# Patient Record
Sex: MALE | Race: WHITE | NOT HISPANIC OR LATINO | URBAN - METROPOLITAN AREA
[De-identification: names, ages, dates, MRNs, and addresses within clinical notes are randomized per-mention and may not be internally consistent; named-entity substitution may affect disease eponyms.]

---

## 2017-05-30 ENCOUNTER — ALLSCRIPTS OFFICE VISIT (OUTPATIENT)
Dept: OTHER | Facility: OTHER | Age: 77
End: 2017-05-30

## 2017-10-03 ENCOUNTER — ALLSCRIPTS OFFICE VISIT (OUTPATIENT)
Dept: OTHER | Facility: OTHER | Age: 77
End: 2017-10-03

## 2017-10-27 NOTE — PROGRESS NOTES
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)    Plan  Hypopituitarism    · Continue with our present treatment plan ; Status:Complete;   Done: [id number]   Ordered;For:Hypopituitarism; Ordered By:Keron Jiang;   · Follow-up visit in 6 months Evaluation and Treatment  Follow-up  Status: Complete   Done: [id number]   Ordered; For: Hypopituitarism; Ordered By: Jayjay Martini Performed:  Due: 65HDX1549; Last Updated By: Mónica Duran; 10/3/2017 1:13:13 PM  Mild cognitive impairment    · Memantine HCl - 10 MG Oral Tablet; take 1 tablet by mouth twice a day   Rx By: Jayjay Martini; Dispense: 30 Days ; #:60 Tablet; Refill: 6;For: Mild cognitive impairment; DENISE = N; Verified Transmission to [id number] Last Updated By: System, [employer]; 10/3/2017 1:11:40 PM    Discussion/Summary  Discussion Summary:   Patient with a history of mild cognitive impairment has been relatively stable on his current medications  He is advised to continue Aricept and Namenda continue cognitive therapy and return back to see me in 6 months  He denies any side effects from the medications  Chief Complaint  Chief Complaint Free Text Note Form: Patient present for a follow up with a history of mild cognitive impairment and hypopituitarism  History of Present Illness  HPI: Patient is here for a follow-up visit with a history of mild cognitive impairment and since his last visit he was hospitalized twice for having had gastroenteritis and pneumonia and immediately after having a confusional episode, referred to [facility] for possible intracerebral hemorrhage  Intracerebral hemorrhage was ruled out and the patient was subsequently discharged home  He remains on Aricept and Namenda and his wife has noticed an improvement, patient is more spontaneous and also has been going to the esu for cognitive therapy  He denies any other neurological symptoms        Review of Systems  Neurological ROS:   Constitutional: no fever, no chills, no recent weight gain, no recent weight loss, no complaints of feeling tired, no changes in appetite  HEENT:  no sinus problems, not feeling congested, no blurred vision, no dryness of the eyes, no eye pain, no hearing loss, no tinnitus, no mouth sores, no sore throat, no hoarseness, no dysphagia, no masses, no bleeding  Cardiovascular:  no chest pain or pressure, no palpitations present, the heart rate was not rapid or irregular, no swelling in the arms or legs, no poor circulation  Respiratory:  no unusual or persistant cough, no shortness of breath with or without exertion  Gastrointestinal:  no nausea, no vomiting, no diarrhea, no abdominal pain, no changes in bowel habits, no melena, no loss of bowel control  Genitourinary:  no incontinence, no feelings of urinary urgency, no increase in frequency, no urinary hesitancy, no dysuria, no hematuria  Musculoskeletal: head/neck/back pain-- and-- in the morning  Integumentary  no masses, no rash, no skin lesions, no livedo reticularis  Psychiatric:  no anxiety, no depression, no mood swings, no psychiatric hospitalizations, no sleep problems  Endocrine  no unusual weight loss or gain, no excessive urination, no excessive thirst, no hair loss or gain, no hot or cold intolerance, no menstrual period change or irregularity, no loss of sexual ability or drive, no erection difficulty, no nipple discharge  Hematologic/Lymphatic:  no unusual bleeding, no tendency for easy bruising, no clotting skin or lumps  Neurological General:  no headache, no nausea or vomiting, no lightheadedness, no convulsions, no blackouts, no syncope, no trauma, no photopsia, no increased sleepiness, no trouble falling asleep, no snoring, no awakening at night  Neurological Mental Status: memory problems     Neurological Cranial Nerves:  no blurry or double vision, no loss of vision, no face drooping, no facial numbness or weakness, no taste or smell loss/changes, no hearing loss or ringing, no vertigo or dizziness, no dysphagia, no slurred speech  Neurological Motor findings include:  no tremor, no twitching, no cramping(pre/post exercise), no atrophy  Neurological Coordination: balance difficulties  Neurological Sensory:  no numbness, no pain, no tingling, does not fall when eyes closed or taking a shower  Neurological Gait:  no difficulty walking, not falling to one side, no sensation of being pushed, has not had falls  ROS Reviewed:   ROS reviewed  Active Problems  1  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)   2  Mild cognitive impairment (331 83) (G31 84)   3  Obstructive sleep apnea (327 23) (G47 33)    Past Medical History  1  History of Carotid bruit (785 9) (R09 89)   2  History of High cholesterol (272 0) (E78 00)   3  History of esophageal reflux (V12 79) (Z87 19)   4  History of hypercholesterolemia (V12 29) (Z86 39)   5  History of hypertension (V12 59) (Z86 79)   6  History of thyroid disease (V12 29) (Z86 39)   7  History of Lumbar strain (847 2) (S39 012A)   8  Obstructive sleep apnea (327 23) (G47 33)  Active Problems And Past Medical History Reviewed: The active problems and past medical history were reviewed and updated today  Surgical History  1  History of Brain Surgery   2  History of Cataract Surgery   3  History of Nasal Septal Deviation Repair    Family History  Mother    1  Family history of    2  Family history of dementia (V17 2) (Z81 8)  Father    3  Family history of    4  Family history of diabetes mellitus (V18 0) (Z83 3)   5  Family history of heart valve insufficiency (V17 49) (Z82 49)  Brother    6  Family history of    7  Family history of diabetes mellitus (V18 0) (Z83 3)   8   Family history of kidney disease (V18 69) (Z84 1)    Social History   · Currently working   · Former smoker (C96 72) (S51 596)   ·    · Occasional alcohol use   · Retired  Social History Reviewed: The social history was reviewed and updated today  Current Meds   1  Amlodipine Besy-Benazepril HCl - 5-20 MG Oral Capsule; TAKE 1 CAPSULE DAILY; Therapy: (Recorded:99Sug1764) to Recorded   2  AndroGel 50 MG/5GM (1%) Transdermal Gel; APPLY 1 PACKET ONE TIME DAILY AS   DIRECTED; Therapy: (Recorded:44Pdb1425) to Recorded   3  Donepezil HCl - 10 MG Oral Tablet; take 1 tablet by mouth every day; Therapy: 89CTA2856 to (Mark Sloan)  Requested for: 08WGH5667; Last   Rx:97Yyk1070 Ordered   4  Gemfibrozil 600 MG Oral Tablet; TAKE 1 TABLET DAILY; Therapy: (Recorded:55Zja0346) to Recorded   5  Hydrocortisone 5 MG Oral Tablet; 4 tabs in AM 3 tabs in PM;   Therapy: (Recorded:61Tog5194) to Recorded   6  Levothyroxine Sodium 88 MCG Oral Tablet; take 0 5 tablet bedtime; Therapy: (Recorded:18Ljl1858) to Recorded   7  LORazepam 1 MG Oral Tablet; TAKE 1 TABLET AT BEDTIME; Therapy: (Recorded:48Txd9851) to Recorded   8  Lovastatin 40 MG Oral Tablet; TAKE 1 TABLET DAILY AS DIRECTED; Therapy: () to Recorded   9  Memantine HCl - 10 MG Oral Tablet; take 1 tablet by mouth twice a day; Therapy: 13KAO1919 to (Nikia Camp)  Requested for: 46KLE4639; Last   Rx:45Reo2757 Ordered   10  Omeprazole 20 MG Oral Tablet Delayed Release; Take 1 tablet daily; Therapy: (Recorded:24Djj3950) to Recorded   11  Oxybutynin Chloride 5 MG Oral Tablet; Take 1 tablet twice daily; Therapy: (Recorded:97Wed3175) to Recorded   12  Tamsulosin HCl - 0 4 MG Oral Capsule; take 1 capsule daily; Therapy: (Recorded:35Lpz7655) to Recorded  Medication List Reviewed: The medication list was reviewed and updated today  Allergies  1  Codeine Derivatives  2  No Known Environmental Allergies   3   No Known Food Allergies    Vitals  Signs   Recorded: 71DDQ3362 12:40PM   Heart Rate: 78  Systolic: 886  Diastolic: 70  Height: 5 ft 6 in  Weight: 178 lb   BMI Calculated: 28 73  BSA Calculated: 1 9    Physical Exam    Constitutional   General appearance: No acute distress, well appearing and well nourished  Musculoskeletal   Gait and station: Normal gait, stance and balance  Muscle strength: Normal strength throughout  Muscle tone: No atrophy, abnormal movements, flaccidity, cogwheeling or spasticity  Neurologic   Orientation to person, place, and time: Normal     Recent and remote memory: Abnormal  -- Montr?al cognitive assessment scale reveals a score of 23/30  Attention span and concentration: Normal thought process and attention span  Language: Names objects, able to repeat phrases and speaks spontaneously  Fund of knowledge: Normal vocabulary with appropriate knowledge of current events and past history  3rd, 4th, and 6th cranial nerves: Normal     5th cranial nerve: Normal     7th cranial nerve: Normal     Sensation: Normal     Reflexes: Normal     Coordination: Normal        Signatures   Electronically signed by :  Sascha Ya MD; Oct  3 2017  1:14PM EST                       (Author)

## 2018-01-13 VITALS
WEIGHT: 178 LBS | DIASTOLIC BLOOD PRESSURE: 64 MMHG | SYSTOLIC BLOOD PRESSURE: 110 MMHG | HEART RATE: 80 BPM | BODY MASS INDEX: 28.61 KG/M2 | HEIGHT: 66 IN

## 2018-01-13 VITALS
DIASTOLIC BLOOD PRESSURE: 70 MMHG | WEIGHT: 178 LBS | SYSTOLIC BLOOD PRESSURE: 138 MMHG | BODY MASS INDEX: 28.61 KG/M2 | HEIGHT: 66 IN | HEART RATE: 78 BPM

## 2018-06-05 DIAGNOSIS — G31.84 MCI (MILD COGNITIVE IMPAIRMENT): Primary | ICD-10-CM

## 2018-06-05 RX ORDER — MEMANTINE HYDROCHLORIDE 10 MG/1
TABLET ORAL
Qty: 60 TABLET | Refills: 6 | Status: SHIPPED | OUTPATIENT
Start: 2018-06-05